# Patient Record
(demographics unavailable — no encounter records)

---

## 2025-07-11 NOTE — PHYSICAL EXAM
[FreeTextEntry3] : pink papules, some with overlying punctate erosions on the elbows, low back, buttocks, and anterior lower legs scalp is clear though patient reports itch

## 2025-07-11 NOTE — ASSESSMENT
[FreeTextEntry1] : 1) Dermatitis, moderate exacerbation of chronic disease, BSA 50-70% - Differential includes arthropod bites vs. atopic dermatitis vs. DH - Negative serologic test for celiac. Based on progression, I favor more of an eczematous dermatitis at this time  - Reviewed risks (as well as mitigation strategies for adverse drug events as applicable), benefits, and alternatives of therapy - Cont halobetasol ointment - Additional treatment pending biopsy  2) Neoplasm of uncertain behavior of skin X 2 - 2 X 4 mm punch biopsies performed on the right thigh. 1 for H&E and 1 for DIF The risks/benefits/alternatives of skin biopsy were explained to the patient which include but are not limited to scar, bleeding, infection, and recurrence. The area was prepped with rubbing alcohol, lidocaine was injected for anesthesia and biopsy was performed. The patient tolerated the procedure well.  I am maintaining a long-term, longitudinal relationship with this patient, overseeing care of chronic conditions, including but not limited to dermatitis. This care relationship has significantly influenced my decision-making and treatment plans during today's encounter      Patient to remove sutures at home

## 2025-07-11 NOTE — HISTORY OF PRESENT ILLNESS
[FreeTextEntry1] : f/u rash [de-identified] : 73 y/o M w/ rash on the body x months. Tried clindamycin solution with minimal improvement in the scalp, though the scalp rash is not as active today. using halobetasol ointment with partial relief on the extremities. Feels the itch is getting worse

## 2025-07-11 NOTE — HISTORY OF PRESENT ILLNESS
[FreeTextEntry1] : f/u rash [de-identified] : 73 y/o M w/ rash on the body x months. Tried clindamycin solution with minimal improvement in the scalp, though the scalp rash is not as active today. using halobetasol ointment with partial relief on the extremities. Feels the itch is getting worse